# Patient Record
Sex: FEMALE | Race: WHITE | NOT HISPANIC OR LATINO | Employment: FULL TIME | URBAN - NONMETROPOLITAN AREA
[De-identification: names, ages, dates, MRNs, and addresses within clinical notes are randomized per-mention and may not be internally consistent; named-entity substitution may affect disease eponyms.]

---

## 2019-10-09 DIAGNOSIS — R94.31 ABNORMAL EKG: Primary | ICD-10-CM

## 2019-10-10 ENCOUNTER — OFFICE VISIT (OUTPATIENT)
Dept: CARDIOLOGY | Facility: CLINIC | Age: 54
End: 2019-10-10

## 2019-10-10 VITALS
OXYGEN SATURATION: 98 % | WEIGHT: 148.6 LBS | DIASTOLIC BLOOD PRESSURE: 60 MMHG | SYSTOLIC BLOOD PRESSURE: 100 MMHG | HEART RATE: 86 BPM | HEIGHT: 60 IN | BODY MASS INDEX: 29.17 KG/M2

## 2019-10-10 DIAGNOSIS — R94.31 ABNORMAL ECG: ICD-10-CM

## 2019-10-10 DIAGNOSIS — R07.89 OTHER CHEST PAIN: Primary | ICD-10-CM

## 2019-10-10 PROCEDURE — 99203 OFFICE O/P NEW LOW 30 MIN: CPT | Performed by: INTERNAL MEDICINE

## 2019-10-10 PROCEDURE — 93000 ELECTROCARDIOGRAM COMPLETE: CPT | Performed by: INTERNAL MEDICINE

## 2019-10-10 RX ORDER — DEXTROAMPHETAMINE SACCHARATE, AMPHETAMINE ASPARTATE MONOHYDRATE, DEXTROAMPHETAMINE SULFATE AND AMPHETAMINE SULFATE 3.75; 3.75; 3.75; 3.75 MG/1; MG/1; MG/1; MG/1
CAPSULE, EXTENDED RELEASE ORAL
COMMUNITY
Start: 2019-09-24

## 2019-10-10 RX ORDER — ESTRADIOL 1 MG/1
TABLET ORAL
COMMUNITY
Start: 2019-09-09

## 2019-10-10 NOTE — PROGRESS NOTES
[facility] Cardiology  OFFICE CONSULT NOTE    Patient Name: Trista Wong  Age/Sex: 53 y.o. female  : 1965  MRN: [id number]      Referring Provider: Lou Flores  [place] [address]        Chief Complaint: Abnormal ECG    History of Present Illness:  Trista Wong is a 53 y.o. female, she is here to do a abnormality in her ECG.  She saw Dr. Flores in [place] who had done a ECG and saw some T wave inversions that were reportedly new from her previous ECG.  She was doing this because she was on Adderall.  On questioning she does have some very atypical chest pain.  It is almost like she set a finger point area it can be sharp or dull it does not really happen with exercise.  It can happen just when she is lying down.  It is fleeting lasting seconds to just minutes.  There is no associated shortness of breath diaphoresis and or nausea or vomiting.    Her only cardiac risk factors is a positive family history in her father who had bypass when he was in his 50s and also she is a postmenopausal female.    Last Echo: None  Last Cath: None      Past Medical History:  History reviewed. No pertinent past medical history.    PAST SURGERY No past surgical history on file.    Current Outpatient Medications:   •  CBD (cannabidiol) oral oil, Take  by mouth., Disp: , Rfl:   •  amphetamine-dextroamphetamine XR (ADDERALL XR) 15 MG 24 hr capsule, , Disp: , Rfl:   •  estradiol (ESTRACE) 1 MG tablet, , Disp: , Rfl:   Home Medications:      (Not in a hospital admission)    Allergies:  No Known Allergies       Social History:  Social History     Socioeconomic History   • Marital status:      Spouse name: Not on file   • Number of children: Not on file   • Years of education: Not on file   • Highest education level: Not on file   Tobacco Use   • Smoking status: Never Smoker   Substance and Sexual Activity   • Alcohol use: No     Frequency: Never   •  Drug use: No   • Sexual activity: Defer   Lifestyle   • Physical activity:     Days per week: 0 days     Minutes per session: Not on file   • Stress: Not on file        Family History:  History reviewed.  Father with heart disease      Review of Systems:   Constitution: No chills, no rigors, no unexplained weight loss or weight gain    Eyes:  No diplopia, no blurred vision, no loss of vision, conjunctiva is pink and sclera is anicteric    ENT:  No tinnitus, no otorrhea, no epistaxis, no sore throat   Respiratory: No cough, no hemoptysis    Cardiovascular:  As mentioned in HPI    Gastrointestinal: No nausea, no vomiting, no hematemesis, no diarrhea or constipation, no melena    Genitourinary: No frequency of dysuria no hematuria    Integument: positive for  No pruritis and  no skin rash    Hematologic / Lymphatic: No excessive bleeding, easy bruising, fatigue, lymphadenopathy and petechiae    Musculoskeletal: No joint pain, joint stiffness, joint swelling, muscle pain, muscle weakness and neck pain    Neurological: No dizziness, headaches, light headedness, seizures and vertigo or stroke    Behavioral/Psych: No depression, or bipolar disorder    Endocrine: no h/o diabetes, hyperlipidemia or thyroid problems        Vitals:    10/10/19 1509   BP: 100/60   Pulse: 86   SpO2: 98%       Body mass index is 29.02 kg/m².          Physical Exam:   General Appearance:    Alert, oriented, cooperative, in no acute distress     Head:    Normocephalic, atraumatic, without obvious abnormality     Eyes:            Lids and lashes normal, conjunctivae and sclerae normal, no   icterus, no pallor     Ears:    Ears appear intact with no abnormalities noted     Throat:   Mucous membranes pink and moist     Neck:   Supple, trachea midline, no carotid bruit, no JVD     Lungs:     Air enty equal,  Clear to auscultation, respirations regular, Unlabored. No wheezes, rales, rhonchi    Heart:    Regular rhythm and normal rate, normal S1 and S2,  no            murmur, no gallop,      Abdomen:     Normal bowel sounds, no masses, liver and spleen nonpalpable, soft, non-tender, non-distended, no guarding, no rebound tenderness       Extremities:   Moves all extremities well, no edema, no cyanosis, no              Redness, no clubbing     Pulses:   Pulses palpable and equal bilaterally       Neurologic:   Alert and oriented to person, place, and time. No focal neurological deficits       Lab Review:     No visits with results within 6 Month(s) from this visit.   Latest known visit with results is:   Conversion Encounter on 09/05/2014   Component Date Value Ref Range Status   • Barbiturates Screen, Urine 09/05/2014 Negative  NEGATIVE Final   • Benzodiazepine Screen, Urine 09/05/2014 Negative  NEGATIVE Final   • Opiate Screen, Urine 09/05/2014 Negative  NEGATIVE Final   • THC Screen Interpretation 09/05/2014 Negative  NEGATIVE Final   • Amphet/Methamphet, Screen, Urine 09/05/2014 Negative  NEGATIVE Final   • Cocaine Screen, Urine 09/05/2014 Negative  NEGATIVE Final   • Oxycodone Screen, Urine 09/05/2014 Negative  NEGATIVE Final   • Methadone Screen, Urine 09/05/2014 Negative  NEGATIVE Final    Comment: DF by IF @ 09/05/2014 13:16  CUTOFF VALUES  Benzo     200 ng/mL   Carlie        200 ng/mL           Cocaine   150 ng/mL   Amph      500 ng/mL  THC          20 ng/mL  Opiates    300 ng/mL  Oxycod    100 ng/mL  Mthadone 300 ng/mL     ]    Assessment and Plan  1. Other chest pain  Her chest pain is very atypical.  Given her risk factors all of her father is age and an abnormal ECG and also being a postmenopausal female we will go ahead and do CT coronary angiogram.  We will also do a echocardiogram to assess for abnormal ECG which could be some form of adult congenital heart disease.  - Adult Transthoracic Echo Complete W/ Cont if Necessary Per Protocol  - CT Angiogram Coronary    2. Abnormal ECG  She has atypical chest pain.  We are going to obtain her old ECG's and  then we will compare it..  Given her risk factors all of her father is age and an abnormal ECG and also being a postmenopausal female we will go ahead and do CT coronary angiogram.  We will also do a echocardiogram to assess for abnormal ECG which could be some form of adult congenital heart disease.  - Adult Transthoracic Echo Complete W/ Cont if Necessary Per Protocol  - CT Angiogram Coronary

## 2019-10-23 LAB
BH CV ECHO MEAS - ACS: 1.9 CM
BH CV ECHO MEAS - AO MAX PG (FULL): 1.1 MMHG
BH CV ECHO MEAS - AO MAX PG: 4.4 MMHG
BH CV ECHO MEAS - AO MEAN PG (FULL): 1 MMHG
BH CV ECHO MEAS - AO MEAN PG: 3 MMHG
BH CV ECHO MEAS - AO ROOT AREA (BSA CORRECTED): 1.6
BH CV ECHO MEAS - AO ROOT AREA: 5.7 CM^2
BH CV ECHO MEAS - AO ROOT DIAM: 2.7 CM
BH CV ECHO MEAS - AO V2 MAX: 105 CM/SEC
BH CV ECHO MEAS - AO V2 MEAN: 78.8 CM/SEC
BH CV ECHO MEAS - AO V2 VTI: 24.3 CM
BH CV ECHO MEAS - ASC AORTA: 2.6 CM
BH CV ECHO MEAS - AVA(I,A): 2.6 CM^2
BH CV ECHO MEAS - AVA(I,D): 2.6 CM^2
BH CV ECHO MEAS - AVA(V,A): 3 CM^2
BH CV ECHO MEAS - AVA(V,D): 3 CM^2
BH CV ECHO MEAS - BSA(HAYCOCK): 1.7 M^2
BH CV ECHO MEAS - BSA: 1.6 M^2
BH CV ECHO MEAS - BZI_BMI: 28.9 KILOGRAMS/M^2
BH CV ECHO MEAS - BZI_METRIC_HEIGHT: 152.4 CM
BH CV ECHO MEAS - BZI_METRIC_WEIGHT: 67.1 KG
BH CV ECHO MEAS - EDV(CUBED): 85.2 ML
BH CV ECHO MEAS - EDV(MOD-SP2): 42 ML
BH CV ECHO MEAS - EDV(MOD-SP4): 60 ML
BH CV ECHO MEAS - EDV(TEICH): 87.7 ML
BH CV ECHO MEAS - EF(CUBED): 65 %
BH CV ECHO MEAS - EF(MOD-SP2): 57.1 %
BH CV ECHO MEAS - EF(MOD-SP4): 61.7 %
BH CV ECHO MEAS - EF(TEICH): 56.8 %
BH CV ECHO MEAS - ESV(CUBED): 29.8 ML
BH CV ECHO MEAS - ESV(MOD-SP2): 18 ML
BH CV ECHO MEAS - ESV(MOD-SP4): 23 ML
BH CV ECHO MEAS - ESV(TEICH): 37.9 ML
BH CV ECHO MEAS - FS: 29.5 %
BH CV ECHO MEAS - IVS/LVPW: 1.1
BH CV ECHO MEAS - IVSD: 0.8 CM
BH CV ECHO MEAS - LA DIMENSION: 3.2 CM
BH CV ECHO MEAS - LA/AO: 1.2
BH CV ECHO MEAS - LV DIASTOLIC VOL/BSA (35-75): 36.5 ML/M^2
BH CV ECHO MEAS - LV MASS(C)D: 100.6 GRAMS
BH CV ECHO MEAS - LV MASS(C)DI: 61.3 GRAMS/M^2
BH CV ECHO MEAS - LV MAX PG: 3.3 MMHG
BH CV ECHO MEAS - LV MEAN PG: 2 MMHG
BH CV ECHO MEAS - LV SYSTOLIC VOL/BSA (12-30): 14 ML/M^2
BH CV ECHO MEAS - LV V1 MAX: 90.9 CM/SEC
BH CV ECHO MEAS - LV V1 MEAN: 64.1 CM/SEC
BH CV ECHO MEAS - LV V1 VTI: 18.2 CM
BH CV ECHO MEAS - LVIDD: 4.4 CM
BH CV ECHO MEAS - LVIDS: 3.1 CM
BH CV ECHO MEAS - LVLD AP2: 6.1 CM
BH CV ECHO MEAS - LVLD AP4: 7 CM
BH CV ECHO MEAS - LVLS AP2: 5.7 CM
BH CV ECHO MEAS - LVLS AP4: 5.4 CM
BH CV ECHO MEAS - LVOT AREA (M): 3.5 CM^2
BH CV ECHO MEAS - LVOT AREA: 3.5 CM^2
BH CV ECHO MEAS - LVOT DIAM: 2.1 CM
BH CV ECHO MEAS - LVPWD: 0.7 CM
BH CV ECHO MEAS - MV A MAX VEL: 63.8 CM/SEC
BH CV ECHO MEAS - MV DEC SLOPE: 398 CM/SEC^2
BH CV ECHO MEAS - MV E MAX VEL: 80.6 CM/SEC
BH CV ECHO MEAS - MV E/A: 1.3
BH CV ECHO MEAS - MV P1/2T MAX VEL: 87.5 CM/SEC
BH CV ECHO MEAS - MV P1/2T: 64.4 MSEC
BH CV ECHO MEAS - MVA P1/2T LCG: 2.5 CM^2
BH CV ECHO MEAS - MVA(P1/2T): 3.4 CM^2
BH CV ECHO MEAS - PA MAX PG (FULL): 0.86 MMHG
BH CV ECHO MEAS - PA MAX PG: 1.9 MMHG
BH CV ECHO MEAS - PA V2 MAX: 68.4 CM/SEC
BH CV ECHO MEAS - RAP SYSTOLE: 5 MMHG
BH CV ECHO MEAS - RV MAX PG: 1 MMHG
BH CV ECHO MEAS - RV MEAN PG: 1 MMHG
BH CV ECHO MEAS - RV V1 MAX: 50.4 CM/SEC
BH CV ECHO MEAS - RV V1 MEAN: 34 CM/SEC
BH CV ECHO MEAS - RV V1 VTI: 11.3 CM
BH CV ECHO MEAS - RVDD: 3 CM
BH CV ECHO MEAS - RVSP: 20.5 MMHG
BH CV ECHO MEAS - SI(AO): 84.7 ML/M^2
BH CV ECHO MEAS - SI(CUBED): 33.7 ML/M^2
BH CV ECHO MEAS - SI(LVOT): 38.4 ML/M^2
BH CV ECHO MEAS - SI(MOD-SP2): 14.6 ML/M^2
BH CV ECHO MEAS - SI(MOD-SP4): 22.5 ML/M^2
BH CV ECHO MEAS - SI(TEICH): 30.3 ML/M^2
BH CV ECHO MEAS - SV(AO): 139.1 ML
BH CV ECHO MEAS - SV(CUBED): 55.4 ML
BH CV ECHO MEAS - SV(LVOT): 63 ML
BH CV ECHO MEAS - SV(MOD-SP2): 24 ML
BH CV ECHO MEAS - SV(MOD-SP4): 37 ML
BH CV ECHO MEAS - SV(TEICH): 49.8 ML
BH CV ECHO MEAS - TR MAX VEL: 197 CM/SEC
MAXIMAL PREDICTED HEART RATE: 169 BPM
STRESS TARGET HR: 144 BPM

## 2019-10-31 ENCOUNTER — TELEPHONE (OUTPATIENT)
Dept: CARDIOLOGY | Facility: CLINIC | Age: 54
End: 2019-10-31

## 2019-10-31 NOTE — TELEPHONE ENCOUNTER
Results given to patient.     ----- Message from Cortney Cardoso RN sent at 10/31/2019  8:08 AM CDT -----  Echo ok

## 2019-11-21 ENCOUNTER — OFFICE VISIT (OUTPATIENT)
Dept: CARDIOLOGY | Facility: CLINIC | Age: 54
End: 2019-11-21

## 2019-11-21 ENCOUNTER — DOCUMENTATION (OUTPATIENT)
Dept: CARDIOLOGY | Facility: CLINIC | Age: 54
End: 2019-11-21

## 2019-11-21 VITALS
SYSTOLIC BLOOD PRESSURE: 102 MMHG | DIASTOLIC BLOOD PRESSURE: 70 MMHG | OXYGEN SATURATION: 98 % | HEART RATE: 75 BPM | WEIGHT: 148 LBS | HEIGHT: 60 IN | BODY MASS INDEX: 29.06 KG/M2

## 2019-11-21 DIAGNOSIS — I10 HYPERTENSION, UNSPECIFIED TYPE: Primary | ICD-10-CM

## 2019-11-21 DIAGNOSIS — R07.89 OTHER CHEST PAIN: Primary | ICD-10-CM

## 2019-11-21 DIAGNOSIS — R94.31 ABNORMAL ECG: ICD-10-CM

## 2019-11-21 PROCEDURE — 99213 OFFICE O/P EST LOW 20 MIN: CPT | Performed by: INTERNAL MEDICINE

## 2019-11-21 NOTE — PROGRESS NOTES
"Trista Wong  54 y.o. female     11/21/2019  Chief Complaint   Patient presents with   • Follow-up       History of Present Illness    Chest pain-        SUBJECTIVE  Patient is following up for chest pain.  She describes it as pinpoint both sides she can actually touch the area and sharp.  She has no shortness of air with it.  Pushing on it actually makes it feel better.  Nothing really makes it feel worse.  She can just be sitting still.  Movement and may make some difference but she is not really sure.  This is fleeting.  No nausea vomiting or diarrhea pheresis.    No Known Allergies      History reviewed. No pertinent past medical history.      History reviewed. No pertinent surgical history.      Family History   Problem Relation Age of Onset   • Heart disease Father          Social History     Socioeconomic History   • Marital status:      Spouse name: Not on file   • Number of children: Not on file   • Years of education: Not on file   • Highest education level: Not on file   Tobacco Use   • Smoking status: Never Smoker   Substance and Sexual Activity   • Alcohol use: No     Frequency: Never   • Drug use: No   • Sexual activity: Defer   Lifestyle   • Physical activity:     Days per week: 0 days     Minutes per session: Not on file   • Stress: Not on file         Prior to Admission medications    Medication Sig Start Date End Date Taking? Authorizing Provider   amphetamine-dextroamphetamine XR (ADDERALL XR) 15 MG 24 hr capsule  9/24/19  Yes ProviderJeremias MD   CBD (cannabidiol) oral oil Take  by mouth.   Yes Provider, MD Jeremias   estradiol (ESTRACE) 1 MG tablet  9/9/19  Yes Provider, MD Jeremias         OBJECTIVE    /70   Pulse 75   Ht 152.4 cm (60\")   Wt 67.1 kg (148 lb)   SpO2 98%   BMI 28.90 kg/m²         Review of Systems     Constitutional:  Denies recent weight loss, weight gain, fever or chills, no change in exercise tolerance     HENT:  Denies any hearing loss, " epistaxis, hoarseness, or difficulty speaking.     Eyes: Wears eyeglasses or contact lenses     Respiratory:  Denies dyspnea with exertion,no cough, wheezing, or hemoptysis.     Cardiovascular: Negative for palpations, chest pain, orthopnea, PND, peripheral edema, syncope, or claudication.     Gastrointestinal:  Denies change in bowel habits, dyspepsia, ulcer disease, hematochezia, or melena.     Endocrine: Negative for cold intolerance, heat intolerance, polydipsia, polyphagia and polyuria. Denies any history of weight change, heat/cold intolerance, polydipsia, polyuria     Genitourinary: Negative.      Musculoskeletal: Denies any history of arthritic symptoms or back problems     Skin:  Denies any change in hair or nails, rashes, or skin lesions.     Allergic/Immunologic: Negative.  Negative for environmental allergies, food allergies and immunocompromised state.     Neurological:  Denies any history of recurrent headaches, strokes, TIA, or seizure disorder.     Hematological: Denies any food allergies, seasonal allergies, bleeding disorders, or lymphadenopathy.     Psychiatric/Behavioral: Denies any history of depression, substance abuse, or change in cognitive function.         Physical Exam     Constitutional: Cooperative, alert and oriented, well-developed, well-nourished, in no acute distress.     HENT:   Head: Normocephalic, normal hair patterns, no masses or tenderness.  Ears, Nose, and Throat: No gross abnormalities. No pallor or cyanosis. Dentition good.   Eyes: EOMS intact, PERRL, conjunctivae and lids unremarkable. Fundoscopic exam and visual fields not performed.   Neck: No palpable masses or adenopathy, no thyromegaly, no JVD, carotid pulses are full and equal bilaterally and without  Bruits.     Cardiovascular: Regular rhythm, S1 and S2 normal, no S3 or S4. Apical impulse not displaced. No murmurs, gallops, or rubs detected.     Pulmonary/Chest: Chest: normal symmetry, no tenderness to palpation,  normal respiratory excursion, no intercostal retraction, no use of accessory muscles.            Pulmonary: Normal breath sounds. No rales or ronchi.    Abdominal: Abdomen soft, bowel sounds normoactive, no masses, no hepatosplenomegaly, non-tender, no bruits.     Musculoskeletal: No deformities, clubbing, cyanosis, erythema, or edema observed. There are no spinal abnormalities noted. Normal muscle strength and tone. Pulses full and equal in all extremities, no bruits auscultated.     Neurological: No gross motor or sensory deficits noted, affect appropriate, oriented to time, person, place.     Skin: Warm and dry to the touch, no apparent skin lesions or masses noted.     Psychiatric: She has a normal mood and affect. Her behavior is normal. Judgment and thought content normal.         Procedures      Lab Results   Component Value Date    WBC 5.7 05/07/2014    HGB 14.0 05/07/2014    HCT 41.8 05/07/2014    MCV 85.1 05/07/2014     05/07/2014     Lab Results   Component Value Date    GLUCOSE 98 05/07/2014    BUN 23 (H) 05/07/2014    CREATININE 0.8 05/07/2014    CO2 25 05/07/2014    CALCIUM 9.4 05/07/2014    ALBUMIN 4.4 05/07/2014    AST 23 05/07/2014    ALT 26 05/07/2014     No results found for: CHOL  Lab Results   Component Value Date    TRIG 37 05/07/2014     Lab Results   Component Value Date    HDL 54 (L) 05/07/2014     No components found for: LDLCALC  Lab Results   Component Value Date     05/07/2014     No results found for: HDLLDLRATIO  No components found for: CHOLHDL  Lab Results   Component Value Date    HGBA1C 5.4 05/07/2014     Lab Results   Component Value Date    TSH 2.02 05/07/2014           ASSESSMENT AND PLAN      Trista was seen today for follow-up.    Diagnoses and all orders for this visit:    Other chest pain  This is very atypical chest pain.  She was supposed to have a cardiac CTA however there was scheduling difficulties.  We will go ahead and proceed on with trying to get this  rescheduled.  He does have abnormal ECG and also a family history given this I believe to proceed with this.  At this time I told her to maybe try some ibuprofen in as this sounds more costochondritic in nature.    Abnormal ECG  .  CTA is being done due to this abnormal CTA.      Patient's Body mass index is 28.9 kg/m². BMI is within normal parameters. No follow-up required..                Marcin Massey MD  11/21/2019  8:37 AM

## 2019-11-21 NOTE — PROGRESS NOTES
Coronary CTA scheduled. Date time and instructions given to patient via phone. No known allergy to IV Contrast. No beta blocker ordered. BMP ordered STAT and to be drawn same day ([facility] has already left office). Patient verbalized understanding

## 2019-11-26 ENCOUNTER — HOSPITAL ENCOUNTER (OUTPATIENT)
Dept: CT IMAGING | Facility: HOSPITAL | Age: 54
Discharge: HOME | End: 2019-11-26
Admitting: INTERNAL MEDICINE

## 2019-11-26 ENCOUNTER — LAB (OUTPATIENT)
Dept: LAB | Facility: HOSPITAL | Age: 54
End: 2019-11-26

## 2019-11-26 VITALS
HEART RATE: 84 BPM | SYSTOLIC BLOOD PRESSURE: 105 MMHG | OXYGEN SATURATION: 96 % | DIASTOLIC BLOOD PRESSURE: 68 MMHG | RESPIRATION RATE: 18 BRPM

## 2019-11-26 DIAGNOSIS — I10 HYPERTENSION, UNSPECIFIED TYPE: ICD-10-CM

## 2019-11-26 LAB
ANION GAP SERPL CALCULATED.3IONS-SCNC: 10 MMOL/L (ref 5–15)
BUN BLD-MCNC: 17 MG/DL (ref 6–20)
BUN/CREAT SERPL: 23.9 (ref 7–25)
CALCIUM SPEC-SCNC: 9.4 MG/DL (ref 8.6–10.5)
CHLORIDE SERPL-SCNC: 102 MMOL/L (ref 98–107)
CO2 SERPL-SCNC: 28 MMOL/L (ref 22–29)
CREAT BLD-MCNC: 0.71 MG/DL (ref 0.57–1)
GFR SERPL CREATININE-BSD FRML MDRD: 86 ML/MIN/1.73
GLUCOSE BLD-MCNC: 92 MG/DL (ref 65–99)
POTASSIUM BLD-SCNC: 4.5 MMOL/L (ref 3.5–5.2)
SODIUM BLD-SCNC: 140 MMOL/L (ref 136–145)

## 2019-11-26 PROCEDURE — 75574 CT ANGIO HRT W/3D IMAGE: CPT

## 2019-11-26 PROCEDURE — 0 IOPAMIDOL PER 1 ML: Performed by: INTERNAL MEDICINE

## 2019-11-26 PROCEDURE — 36415 COLL VENOUS BLD VENIPUNCTURE: CPT

## 2019-11-26 PROCEDURE — 80048 BASIC METABOLIC PNL TOTAL CA: CPT

## 2019-11-26 RX ORDER — METOPROLOL TARTRATE 5 MG/5ML
INJECTION INTRAVENOUS
Status: COMPLETED
Start: 2019-11-26 | End: 2019-11-26

## 2019-11-26 RX ADMIN — METOPROLOL TARTRATE 5 MG: 5 INJECTION INTRAVENOUS at 10:16

## 2019-11-26 RX ADMIN — IOPAMIDOL 75 ML: 755 INJECTION, SOLUTION INTRAVENOUS at 10:35

## 2020-05-28 ENCOUNTER — OFFICE VISIT (OUTPATIENT)
Dept: CARDIOLOGY | Facility: CLINIC | Age: 55
End: 2020-05-28

## 2020-05-28 VITALS
SYSTOLIC BLOOD PRESSURE: 98 MMHG | HEIGHT: 60 IN | WEIGHT: 148 LBS | BODY MASS INDEX: 29.06 KG/M2 | DIASTOLIC BLOOD PRESSURE: 76 MMHG | HEART RATE: 83 BPM

## 2020-05-28 DIAGNOSIS — R94.31 ABNORMAL ECG: Primary | ICD-10-CM

## 2020-05-28 DIAGNOSIS — I10 HYPERTENSION, UNSPECIFIED TYPE: ICD-10-CM

## 2020-05-28 PROCEDURE — 99442 PR PHYS/QHP TELEPHONE EVALUATION 11-20 MIN: CPT | Performed by: INTERNAL MEDICINE

## 2020-05-28 RX ORDER — MULTIVITAMIN WITH IRON
TABLET ORAL
COMMUNITY

## 2020-05-28 RX ORDER — FLUTICASONE PROPIONATE 50 MCG
2 SPRAY, SUSPENSION (ML) NASAL DAILY
COMMUNITY

## 2020-05-28 NOTE — PROGRESS NOTES
"Trista Wong  54 y.o. female    05/28/2020  Chief Complaint   Patient presents with   • Follow-up   Patient consents to telephone visit.    History of Present Illness    AbNormal ECG  -        SUBJECTIVE  Patient presents with abnormal ECG.  We did a coronary work-up and also work-up ASD PFO or VSD and did not see any.  She is doing well.  He has no chest pain no shortness of air.  She is all in all feeling well.  She has had no syncope.  No Known Allergies      History reviewed. No pertinent past medical history.      History reviewed. No pertinent surgical history.      Family History   Problem Relation Age of Onset   • Heart disease Father          Social History     Socioeconomic History   • Marital status:      Spouse name: Not on file   • Number of children: Not on file   • Years of education: Not on file   • Highest education level: Not on file   Tobacco Use   • Smoking status: Never Smoker   Substance and Sexual Activity   • Alcohol use: No     Frequency: Never   • Drug use: No   • Sexual activity: Defer   Lifestyle   • Physical activity:     Days per week: 0 days     Minutes per session: Not on file   • Stress: Not on file         Prior to Admission medications    Medication Sig Start Date End Date Taking? Authorizing Provider   amphetamine-dextroamphetamine XR (ADDERALL XR) 15 MG 24 hr capsule  9/24/19  Yes Jeremias Bowen MD   estradiol (ESTRACE) 1 MG tablet  9/9/19  Yes Jeremias Bowen MD   fluticasone (FLONASE) 50 MCG/ACT nasal spray 2 sprays into the nostril(s) as directed by provider Daily.   Yes Jeremias Bowen MD   Magnesium 250 MG tablet Take  by mouth.   Yes Jeremias Bowen MD   CBD (cannabidiol) oral oil Take  by mouth.    Jeremias Bowen MD         OBJECTIVE    BP 98/76   Pulse 83   Ht 152.4 cm (60\")   Wt 67.1 kg (148 lb)   BMI 28.90 kg/m²         Review of Systems     Constitutional:  Denies recent weight loss, weight gain, fever or " chills, no change in exercise tolerance     HENT:  Denies any hearing loss, epistaxis, hoarseness, or difficulty speaking.     Eyes: Wears eyeglasses or contact lenses     Respiratory:  Denies dyspnea with exertion,no cough, wheezing, or hemoptysis.     Cardiovascular: Negative for palpations, chest pain, orthopnea, PND, peripheral edema, syncope, or claudication.     Gastrointestinal:  Denies change in bowel habits, dyspepsia, ulcer disease, hematochezia, or melena.     Endocrine: Negative for cold intolerance, heat intolerance, polydipsia, polyphagia and polyuria. Denies any history of weight change, heat/cold intolerance, polydipsia, polyuria     Genitourinary: Negative.      Musculoskeletal: Denies any history of arthritic symptoms or back problems     Skin:  Denies any change in hair or nails, rashes, or skin lesions.     Allergic/Immunologic: Negative.  Negative for environmental allergies, food allergies and immunocompromised state.     Neurological:  Denies any history of recurrent headaches, strokes, TIA, or seizure disorder.     Hematological: Denies any food allergies, seasonal allergies, bleeding disorders, or lymphadenopathy.     Psychiatric/Behavioral: Denies any history of depression, substance abuse, or change in cognitive function.         Physical Exam     Constitutional: Cooperative, alert and oriented, well-developed, well-nourished, in no acute distress.     HENT:     Cardiovascular:    Pulmonary/Chest:            Pulmonary: Normal breath sounds. No rales or ronchi.    Abdominal:   Musculoskeletal:   Neurological: No gross motor or sensory deficits noted, affect appropriate, oriented to time, person, place.     Skin: .     Psychiatric: She has a normal mood and affect. Her behavior is normal. Judgment and thought content normal.         Procedures      Lab Results   Component Value Date    WBC 5.7 05/07/2014    HGB 14.0 05/07/2014    HCT 41.8 05/07/2014    MCV 85.1 05/07/2014      05/07/2014     Lab Results   Component Value Date    GLUCOSE 92 11/26/2019    BUN 17 11/26/2019    CREATININE 0.71 11/26/2019    EGFRIFNONA 86 11/26/2019    BCR 23.9 11/26/2019    CO2 28.0 11/26/2019    CALCIUM 9.4 11/26/2019    ALBUMIN 4.4 05/07/2014    AST 23 05/07/2014    ALT 26 05/07/2014     No results found for: CHOL  Lab Results   Component Value Date    TRIG 37 05/07/2014     Lab Results   Component Value Date    HDL 54 (L) 05/07/2014     No components found for: LDLCALC  Lab Results   Component Value Date     05/07/2014     No results found for: HDLLDLRATIO  No components found for: CHOLHDL  Lab Results   Component Value Date    HGBA1C 5.4 05/07/2014     Lab Results   Component Value Date    TSH 2.02 05/07/2014           ASSESSMENT AND PLAN      Trista was seen today for follow-up.    Diagnoses and all orders for this visit:    Abnormal ECG  This was sent her regular axis and nonspecific ST-T wave changes.  Her coronary disease work-up is negative.  We will continue to watch her.  He has no history of lung disease but is check with her primary care physician for genetic along disease work-up.  Hypertension, unspecified type  This is under good control.      Patient's Body mass index is 28.9 kg/m². BMI is within normal parameters. No follow-up required..    Total visit was between 11-20 minutes.            Marcin Massey MD  5/28/2020  08:59